# Patient Record
(demographics unavailable — no encounter records)

---

## 2025-05-27 NOTE — PHYSICAL EXAM
[de-identified] : Right MF ulnar small mass -hard and mobile near MCP crease - no skin changes - full painless finger ROM SITLT to both UDN and RDN   Left volar wrist mass 5cm x3cm - TTP - talniflumates - mobile and soft - palpable radial pulse wrist ROM Full and not painful

## 2025-05-27 NOTE — HISTORY OF PRESENT ILLNESS
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  Had previous x-rays done at an urgent care which did not demonstrate remarkable findings.  No significant past medical history.  Works as a patient transporter aid.

## 2025-05-27 NOTE — DISCUSSION/SUMMARY
[FreeTextEntry1] : 41-year-old male with a right small volar cyst to the MCP crease of the index finger we discussed removing this in the office and the different risks and benefits.  As for his very large left volar ganglion cyst we discussed removing this in the operating room where the radial artery and we discussed taking at least 2 weeks off of work.  He said he will talk to his work.  I have spent 35 minutes of time on the encounter which excludes teaching and/or separately reported services